# Patient Record
Sex: MALE | Race: WHITE | NOT HISPANIC OR LATINO | Employment: STUDENT | URBAN - METROPOLITAN AREA
[De-identification: names, ages, dates, MRNs, and addresses within clinical notes are randomized per-mention and may not be internally consistent; named-entity substitution may affect disease eponyms.]

---

## 2024-02-26 ENCOUNTER — HOSPITAL ENCOUNTER (OUTPATIENT)
Dept: RADIOLOGY | Facility: CLINIC | Age: 19
Discharge: HOME | End: 2024-02-26
Payer: COMMERCIAL

## 2024-02-26 DIAGNOSIS — M25.532 PAIN IN LEFT WRIST: ICD-10-CM

## 2024-02-26 PROCEDURE — 73110 X-RAY EXAM OF WRIST: CPT | Mod: LT

## 2024-02-26 PROCEDURE — 73110 X-RAY EXAM OF WRIST: CPT | Mod: LEFT SIDE | Performed by: RADIOLOGY

## 2024-03-05 DIAGNOSIS — M84.332A: Primary | ICD-10-CM

## 2024-03-06 ENCOUNTER — HOSPITAL ENCOUNTER (OUTPATIENT)
Dept: RADIOLOGY | Facility: HOSPITAL | Age: 19
Discharge: HOME | End: 2024-03-06
Payer: COMMERCIAL

## 2024-03-06 DIAGNOSIS — M84.332A: ICD-10-CM

## 2024-03-06 PROCEDURE — 73221 MRI JOINT UPR EXTREM W/O DYE: CPT | Mod: LEFT SIDE | Performed by: STUDENT IN AN ORGANIZED HEALTH CARE EDUCATION/TRAINING PROGRAM

## 2024-03-06 PROCEDURE — 73221 MRI JOINT UPR EXTREM W/O DYE: CPT | Mod: LT

## 2024-03-11 ENCOUNTER — OFFICE VISIT (OUTPATIENT)
Dept: ORTHOPEDIC SURGERY | Facility: HOSPITAL | Age: 19
End: 2024-03-11
Payer: COMMERCIAL

## 2024-03-11 DIAGNOSIS — S69.82XA TFCC (TRIANGULAR FIBROCARTILAGE COMPLEX) INJURY, LEFT, INITIAL ENCOUNTER: Primary | ICD-10-CM

## 2024-03-11 PROCEDURE — 99204 OFFICE O/P NEW MOD 45 MIN: CPT | Performed by: STUDENT IN AN ORGANIZED HEALTH CARE EDUCATION/TRAINING PROGRAM

## 2024-03-11 ASSESSMENT — PAIN SCALES - GENERAL: PAINLEVEL_OUTOF10: 9

## 2024-03-11 ASSESSMENT — PAIN DESCRIPTION - DESCRIPTORS: DESCRIPTORS: ACHING

## 2024-03-11 ASSESSMENT — PAIN - FUNCTIONAL ASSESSMENT: PAIN_FUNCTIONAL_ASSESSMENT: 0-10

## 2024-03-11 NOTE — PROGRESS NOTES
History of Present Illness   Patient presents today for evaluation of side: left upper extremity pain.    The patient sustained an acute injury on  approximately 2 weeks ago .  The patient is a  he [employer].  He injured his left wrist while playing baseball approximately 2 weeks ago.  He had x-rays performed which were negative.  Continues to have pain and had an MRI which demonstrated a TFCC tear.  He has not been immobilized.  He has not had any formal treatment.  He has tried to return to sport however he cannot swing a bat.  The patient denies any loss of consciousness or additional significant injuries.  The patient denies any current numbness or tingling.  The pain is sharp, acute in nature, better with rest worse with motion.    The patient is right-hand dominant.  He was seen with his father and girlfriend present today.     No past medical history on file.    Medication Documentation Review Audit       Reviewed by Jenniffer Reid MA (Medical Assistant) on 03/11/24 at 0903      Medication Order Taking? Sig Documenting Provider Last Dose Status            No Medications to Display                                   No Known Allergies    Social History     Socioeconomic History    Marital status: Single     Spouse name: Not on file    Number of children: Not on file    Years of education: Not on file    Highest education level: Not on file   Occupational History    Not on file   Tobacco Use    Smoking status: Not on file    Smokeless tobacco: Not on file   Substance and Sexual Activity    Alcohol use: Not on file    Drug use: Not on file    Sexual activity: Not on file   Other Topics Concern    Not on file   Social History Narrative    Not on file     Social Determinants of Health     Financial Resource Strain: Not on file   Food Insecurity: Not on file   Transportation Needs: Not on file   Physical Activity: Not on file   Stress: Not on file   Social Connections: Not on file    Intimate Partner Violence: Not on file   Housing Stability: Not on file       No past surgical history on file.       Review of Systems   GENERAL: Negative  GI: Negative  MUSCULOSKELETAL: See HPI  SKIN: Negative  NEURO:  Negative     Physical Exam:  side: left upper extremity:  Skin healthy to gross inspection, no breakdown  No swelling / ecchymosis noted  Mild pain to palpation over the fovea.  Mild pain with axial loading and ulnar deviation.  Nontender over the ECU.  Negative ECU Synergy test.  No subluxating ECU tendon with wrist range of motion.  Nontender over the DRUJ.  Nontender over the LT interval.  No pain with DRUJ compression testing.  Negative piano key test.  No instability with DRUJ testing in neutral, supination, pronation..  This is symmetrical to the contralateral wrist.  Intact flexion and extension of 1st IP joint and finger abduction  Sensation intact to light touch medial / ulnar and radial nerve distribution   Good cap refill     Imaging  XR of the left wrist reviewed in office today.  No fracture or dislocation    MRI of the left wrist taken reviewed in office today.  There is a split tear of the ECU tendon.  However the tendon remains in its groove.  There is a TFCC tear in the ulnar triquetral and meniscal homolog.     Assessment   Patient with an acute side: left TFCC tear     Plan:  We had a lengthy discussion about ulnar-sided wrist pain and the likely etiologies.  We reviewed TFCC pathology, ECU tendinitis, ulnar impaction syndrome.  There is no radiographic or clinical evidence of ulnar impaction.  Symptoms appear to be localized to the TFCC.  The patient had an MRI consistent with a TFCC injury however his DRUJ remains stable on exam.  I have recommended initial steroid injection with casting, as many pathologies in the ulnar wrist are responsive to this.  They are amenable to this plan and wish to proceed.  I would like to see them back in 4 to 6 weeks for repeat evaluation or  sooner with any concerns.  If, at that time, they demonstrate a good response to injection, we can consider repeat injection and/or a gradual return to activity.  He will likely start occupational therapy at that time.  All questions answered today.      Follow-up  4 to 6 weeks

## 2024-04-08 ENCOUNTER — APPOINTMENT (OUTPATIENT)
Dept: ORTHOPEDIC SURGERY | Facility: HOSPITAL | Age: 19
End: 2024-04-08
Payer: COMMERCIAL

## 2024-04-09 ENCOUNTER — APPOINTMENT (OUTPATIENT)
Dept: OCCUPATIONAL THERAPY | Facility: HOSPITAL | Age: 19
End: 2024-04-09
Payer: COMMERCIAL

## 2024-04-09 ENCOUNTER — OFFICE VISIT (OUTPATIENT)
Dept: ORTHOPEDIC SURGERY | Facility: HOSPITAL | Age: 19
End: 2024-04-09
Payer: COMMERCIAL

## 2024-04-09 DIAGNOSIS — S69.82XA TFCC (TRIANGULAR FIBROCARTILAGE COMPLEX) INJURY, LEFT, INITIAL ENCOUNTER: Primary | ICD-10-CM

## 2024-04-09 PROCEDURE — 99213 OFFICE O/P EST LOW 20 MIN: CPT | Performed by: STUDENT IN AN ORGANIZED HEALTH CARE EDUCATION/TRAINING PROGRAM

## 2024-04-09 PROCEDURE — L3908 WHO COCK-UP NONMOLDE PRE OTS: HCPCS | Performed by: STUDENT IN AN ORGANIZED HEALTH CARE EDUCATION/TRAINING PROGRAM

## 2024-04-09 ASSESSMENT — PAIN - FUNCTIONAL ASSESSMENT: PAIN_FUNCTIONAL_ASSESSMENT: 0-10

## 2024-04-09 ASSESSMENT — PAIN SCALES - GENERAL: PAINLEVEL_OUTOF10: 0 - NO PAIN

## 2024-04-09 NOTE — PROGRESS NOTES
History of Present Illness   Patient presents today for evaluation of side: left upper extremity pain.    The patient sustained an acute injury on  approximately 2 weeks ago .  The patient is a  he [employer].  He injured his left wrist while playing baseball approximately 2 weeks ago.  He had x-rays performed which were negative.  Continues to have pain and had an MRI which demonstrated a TFCC tear.  He has not been immobilized.  He has not had any formal treatment.  He has tried to return to sport however he cannot swing a bat.  The patient denies any loss of consciousness or additional significant injuries.  The patient denies any current numbness or tingling.  The pain is sharp, acute in nature, better with rest worse with motion.    The patient is right-hand dominant.  He was seen with his father and girlfriend present today.    4/9/2024 follow-up:  Patient presents for follow-up today.  He has remained in a cast over the last 4 weeks.  As last appointment he had a corticosteroid injection to the TFCC region.  He states that his ulnar-sided wrist pain has significantly improved however he has been immobilized.  He denies numbness and tingling.  He denies fevers or chills.  He is currently registered in this ER as he will be missing his season.     No past medical history on file.    Medication Documentation Review Audit       Reviewed by Jenniffer Reid MA (Medical Assistant) on 03/11/24 at 0903      Medication Order Taking? Sig Documenting Provider Last Dose Status            No Medications to Display                                   No Known Allergies    Social History     Socioeconomic History    Marital status: Single     Spouse name: Not on file    Number of children: Not on file    Years of education: Not on file    Highest education level: Not on file   Occupational History    Not on file   Tobacco Use    Smoking status: Not on file    Smokeless tobacco: Not on file    Substance and Sexual Activity    Alcohol use: Not on file    Drug use: Not on file    Sexual activity: Not on file   Other Topics Concern    Not on file   Social History Narrative    Not on file     Social Determinants of Health     Financial Resource Strain: Not on file   Food Insecurity: Not on file   Transportation Needs: Not on file   Physical Activity: Not on file   Stress: Not on file   Social Connections: Not on file   Intimate Partner Violence: Not on file   Housing Stability: Not on file       No past surgical history on file.       Review of Systems   GENERAL: Negative  GI: Negative  MUSCULOSKELETAL: See HPI  SKIN: Negative  NEURO:  Negative     Physical Exam:  side: left upper extremity:  Cast removed in office.  Skin healthy to gross inspection, no breakdown  No swelling / ecchymosis noted  Nontender to palpation over the fovea.  No pain with axial loading and ulnar deviation.  Nontender over the ECU.  Negative ECU Synergy test.  No subluxating ECU tendon with wrist range of motion.  Nontender over the DRUJ.  Nontender over the LT interval.  No pain with DRUJ compression testing.  Negative piano key test.  No instability with DRUJ testing in neutral, supination, pronation..  This is symmetrical to the contralateral wrist.  Intact flexion and extension of 1st IP joint and finger abduction  Sensation intact to light touch medial / ulnar and radial nerve distribution   Good cap refill     Imaging  XR of the left wrist reviewed in office today.  No fracture or dislocation    MRI of the left wrist taken reviewed in office today.  There is a split tear of the ECU tendon.  However the tendon remains in its groove.  There is a TFCC tear in the ulnar triquetral and meniscal homolog.     Assessment   Patient with an side: left TFCC tear     Plan:  Patient's cast removed in office today.  He is nontender over the ulnar side of the wrist.  His provocative exam findings are negative.  Overall he is doing well.  We  will get him into occupational hand therapy.  I referred to a hand therapist that specifically works with athletes.  He was given a cock up wrist brace.  He will slowly return to activity the guidance of therapist and athletic trainers.  He will follow-up in 4 weeks     Follow-up  4  weeks

## 2024-04-11 ENCOUNTER — EVALUATION (OUTPATIENT)
Dept: OCCUPATIONAL THERAPY | Facility: HOSPITAL | Age: 19
End: 2024-04-11
Payer: COMMERCIAL

## 2024-04-11 DIAGNOSIS — S69.82XA TFCC (TRIANGULAR FIBROCARTILAGE COMPLEX) INJURY, LEFT, INITIAL ENCOUNTER: ICD-10-CM

## 2024-04-11 PROCEDURE — 97110 THERAPEUTIC EXERCISES: CPT | Mod: GO | Performed by: OCCUPATIONAL THERAPIST

## 2024-04-11 PROCEDURE — 97165 OT EVAL LOW COMPLEX 30 MIN: CPT | Mod: GO | Performed by: OCCUPATIONAL THERAPIST

## 2024-04-11 PROCEDURE — 97016 VASOPNEUMATIC DEVICE THERAPY: CPT | Mod: GO | Performed by: OCCUPATIONAL THERAPIST

## 2024-04-11 ASSESSMENT — PAIN - FUNCTIONAL ASSESSMENT: PAIN_FUNCTIONAL_ASSESSMENT: 0-10

## 2024-04-11 ASSESSMENT — PAIN SCALES - GENERAL: PAINLEVEL_OUTOF10: 0 - NO PAIN

## 2024-04-11 NOTE — PROGRESS NOTES
Occupational Therapy  Occupational Therapy Orthopedic Evaluation    Patient Name: Shorty Mitchell  MRN: [id number]  Today's Date: 4/11/2024     Insurance:  Visit number: 1 of BMN  Authorization info: No auth  Insurance Type: MMO    General:  Reason for visit: L wrist TFCC injury  Referred by: Dr Oseguera    Current Problem  1. TFCC (triangular fibrocartilage complex) injury, left, initial encounter  Referral to Occupational Therapy          Precautions: No WB  TFCC rehab protool       Medical History Form: Reviewed (scanned into chart)    Subjective:   Chief Complaint: L wrist  KIMBERLYN: pt dove for a ball in practice, felt some pain and then a pop later in the day  DOI: 2/8/24      Hand Dominance: Right    Current Condition since injury:   better      PAIN  Pain Assessment: 0-10  Pain Score: 0 - No pain  Up to 4-5/10  Location: L ulnar wrist  Description: sharp  Aggravating Factors: any quick movement of the wrist  Relieving Factors: wrist brace, heat    Relevant Information (PMH & Previous Tests/Imaging): MRI 3/6/24 -TFCC tear/split ECU tendon  Previous Interventions/Treatments: casted x4 weeks    Prior Level of Function (PLOF)  Exercise/Physical Activity: [employer], [place].  Currently not playing, will have 2 more years of eligibility  Work/School: Sports management major  Current ADL/IADL Status: limited use of R hand in brace     Patients Living Environment: Reviewed and no concern    Primary Language: English    Pt goals for therapy: return to play and use of arm    Red Flags: Do you have any of the following? No  Fever/chills, unexplained weight changes, dizziness/fainting, unexplained change in bowel or bladder functions, unexplained malaise or muscle weakness, night pain/sweats, numbness or tingling    Objective:  WRIST AROM (Degrees)   R L   Extension 75 60   Flexion 70 60   Radial Deviation 20 15   Ulnar Deviation 40 30   Pronation 70 60   Supination 75 75      Right Hand AROM (degrees)  WNL      Hand Strength Measures (lbs)   R L   Dynamometer  defer defer   Lateral Pinch defer defer   3jaw Pinch defer defer        Special Tests    TFCC   Ulnar Fovea Sign: +  TFCC Load Test: +    AIN Weakness: -  PIN Weakness: -    Physical Observation: unremarkable  Edema: none observed    Sensory: intact light touch  Numbness/Tingling: none reported    Outcome Measures:  DASH: 29.55    EDUCATION: home exercise program, plan of care, activity modifications, pain management, and injury pathology       Goals:      Plan of care was developed with input and agreement by the patient    Treatments:     Modalities:      18 min  X10 min themx cold/hot compression to L wrist  34 degrees F @ 45 mmHg pressure   X8 min continuous 3MHz @ 1.0w/cm2 to L ulnar wrist      Therapeutic Exercise:   10 min  HEP discussed -  AROM wrist flex/ext, RD/UD, pro/sup, AAROM ball rolls     Manual Therapy:     5 min  STM/IASTM dorsal forearm and wrist       Assessment: Patient is a 20 yo male  s/p TFCC/ECU injury to L wrist.  He has been immobilized x4 weeks and has no instability at this time.  His condition is resulting in limited participation in pain-free ADLs and inability to perform at their prior level of function. Pt would benefit from occupational therapy to address the impairments found & listed previously in the objective section in order to return to safe and pain-free ADLs and prior level of function.       Plan:      Planned Interventions include: therapeutic exercise, therapeutic activity, self-care home management, manual therapy, therapeutic activities, gait training, neuromuscular coordination, vasopneumatic, dry needling, aquatic therapy, electric stimulation, fluidotherapy, ultrasound, kinesiotaping, orthosis fabrication, wound care  Frequency: 1-2 x Week  Duration: 12 Weeks    Butch Louis, OT

## 2024-04-18 ENCOUNTER — TREATMENT (OUTPATIENT)
Dept: OCCUPATIONAL THERAPY | Facility: HOSPITAL | Age: 19
End: 2024-04-18
Payer: COMMERCIAL

## 2024-04-18 DIAGNOSIS — S69.82XA TFCC (TRIANGULAR FIBROCARTILAGE COMPLEX) INJURY, LEFT, INITIAL ENCOUNTER: Primary | ICD-10-CM

## 2024-04-18 PROCEDURE — 97035 APP MDLTY 1+ULTRASOUND EA 15: CPT | Mod: GO | Performed by: OCCUPATIONAL THERAPIST

## 2024-04-18 PROCEDURE — 97022 WHIRLPOOL THERAPY: CPT | Mod: GO | Performed by: OCCUPATIONAL THERAPIST

## 2024-04-18 PROCEDURE — 97016 VASOPNEUMATIC DEVICE THERAPY: CPT | Mod: GO | Performed by: OCCUPATIONAL THERAPIST

## 2024-04-18 PROCEDURE — 97140 MANUAL THERAPY 1/> REGIONS: CPT | Mod: GO | Performed by: OCCUPATIONAL THERAPIST

## 2024-04-18 ASSESSMENT — PAIN - FUNCTIONAL ASSESSMENT: PAIN_FUNCTIONAL_ASSESSMENT: 0-10

## 2024-04-18 ASSESSMENT — PAIN SCALES - GENERAL: PAINLEVEL_OUTOF10: 0 - NO PAIN

## 2024-04-18 NOTE — PROGRESS NOTES
Occupational Therapy  Occupational Therapy Treatment    Patient Name: Shorty Mitchell  MRN: [id number]  Today's Date: 4/18/2024  Time Calculation  Start Time: 0855  Stop Time: 0941  Time Calculation (min): 46 min  Insurance:  Visit number: 1 of N  Authorization info: No auth  Insurance Type: MMO    General:  Reason for visit: L wrist TFCC injury  Referred by: Dr Oseguera  DOI: 2/8/24     Current Problem  1. TFCC (triangular fibrocartilage complex) injury, left, initial encounter            Precautions   Progress within pain tolerance      Subjective:   Patient reports no new concerns    Performing HEP?: Yes    Pain  Pain Assessment: 0-10  Pain Score: 0 - No pain  Location: L wrist ulnar  Description: occasional tweak, less pain overall    Objective:     WRIST AROM (Degrees)    R L   Extension 75 75   Flexion 70 70   Radial Deviation 20 20   Ulnar Deviation 40 40   Pronation 70 70   Supination 75 75      Right Hand AROM (degrees)  WNL     Hand Strength Measures (lbs)    R L   Dynamometer  140 140   Lateral Pinch     3jaw Pinch                                Special Tests     TFCC              Ulnar Fovea Sign: +  TFCC Load Test: +     AIN Weakness: -  PIN Weakness: -     Physical Observation: unremarkable  Edema: none observed     Sensory: intact light touch  Numbness/Tingling: none reported    Treatments:     Modalities:      26 min  X10 min themx cold/hot compression to L hand/wrist 34 degrees F @ 45 mmHg pressure   X8 min fluiotherapy to L hand, 120 degrees F @ 70% air speed  X8 min continuous 3MHz @ 1.0w/cm2 to L ulnar wrist       Therapeutic Exercise:   5 min  Yellow tband wrist flex/ext, RD 2x10 each   exerciser 25# 3x10     Manual Therapy:     15 min  STM/IASTM dorsal/volar forearm and wrist  Light passive wrist flex/ext, RD/UD to tolerance       Assessment: Pt now with symmetrical motion as well as  strength without pain.  He does still get a small twinge of pain with certain motions however has  been wearing splint as directed.  We will plan to slowly advance his activity, he is starting  exercises for his HEP today, and we will plan to start light wrist stability exercise next visit as long as he continues to do as well as he currently is.       Plan: Continue with ROM/strengthening for hand wrist as tolerated.  Anticipate progressing to wrist strengthening next session       Butch Louis, OT

## 2024-04-25 ENCOUNTER — TREATMENT (OUTPATIENT)
Dept: OCCUPATIONAL THERAPY | Facility: HOSPITAL | Age: 19
End: 2024-04-25
Payer: COMMERCIAL

## 2024-04-25 DIAGNOSIS — S69.82XA TFCC (TRIANGULAR FIBROCARTILAGE COMPLEX) INJURY, LEFT, INITIAL ENCOUNTER: Primary | ICD-10-CM

## 2024-04-25 PROCEDURE — 97035 APP MDLTY 1+ULTRASOUND EA 15: CPT | Mod: GO | Performed by: OCCUPATIONAL THERAPIST

## 2024-04-25 PROCEDURE — 97110 THERAPEUTIC EXERCISES: CPT | Mod: GO | Performed by: OCCUPATIONAL THERAPIST

## 2024-04-25 PROCEDURE — 97022 WHIRLPOOL THERAPY: CPT | Mod: GO | Performed by: OCCUPATIONAL THERAPIST

## 2024-04-25 ASSESSMENT — PAIN - FUNCTIONAL ASSESSMENT: PAIN_FUNCTIONAL_ASSESSMENT: 0-10

## 2024-04-25 ASSESSMENT — PAIN SCALES - GENERAL: PAINLEVEL_OUTOF10: 0 - NO PAIN

## 2024-04-25 NOTE — PROGRESS NOTES
Occupational Therapy  Occupational Therapy Treatment    Patient Name: Shorty Mitchell  MRN: [id number]  Today's Date: 4/25/2024  Time Calculation  Start Time: 1000  Stop Time: 1046  Time Calculation (min): 46 min  Insurance:  Visit number: 3 of BMN  Authorization info: No auth  Insurance Type: MMO    General:  Reason for visit: L wrist TFCC injury  Referred by: Dr Oseguera  DOI: 2/8/24     Current Problem  1. TFCC (triangular fibrocartilage complex) injury, left, initial encounter            Precautions   Progress within pain tolerance      Subjective:   Patient reports wrist has been feeling better, has taken brace off for light activity    Performing HEP?: Yes    Pain  Pain Assessment: 0-10  Pain Score: 0 - No pain  Location: L wrist ulnar  Description: occasional tweak, less pain overall    Objective:     WRIST AROM (Degrees)    R L   Extension 75 75   Flexion 70 70   Radial Deviation 20 20   Ulnar Deviation 40 40   Pronation 70 70   Supination 75 75      Right Hand AROM (degrees)  WNL     Hand Strength Measures (lbs)    R L   Dynamometer  140 140   Lateral Pinch     3jaw Pinch                                Special Tests     TFCC              Ulnar Fovea Sign: +  TFCC Load Test: +     AIN Weakness: -  PIN Weakness: -     Physical Observation: unremarkable  Edema: none observed     Sensory: intact light touch  Numbness/Tingling: none reported    Treatments:     Modalities:      16 min    X8 min fluiotherapy to L hand, 120 degrees F @ 70% air speed  X8 min continuous 3MHz @ 1.0w/cm2 to L ulnar wrist       Therapeutic Exercise:   30 min  HEP progressed - dbell/tband wrist flex/ext, pro/sup, RD/UD, shoulder eleveated UD  Trialed UE resistance exercises with focus on wrist stability - neutral  hammer curl, neutral tricep ext, row with good tolerance       Assessment: Pt progressed to light resistance exercises today with good tolerance.  We will continue to progress upper extremity strength in the upcoming  weeks.  He may be ready in a few weeks to start easing into regular sport activity.  We can perform more whole body kinetic chain exercises as he continues to build wrist stability and is able to tolerate more.       Plan: Continue with ROM/strengthening for hand wrist and progression to more proximal UE strengthening requiring increased wrist stability.       Butch Louis, OT

## 2024-04-26 ENCOUNTER — APPOINTMENT (OUTPATIENT)
Dept: OCCUPATIONAL THERAPY | Facility: HOSPITAL | Age: 19
End: 2024-04-26
Payer: COMMERCIAL

## 2024-04-30 ENCOUNTER — OFFICE VISIT (OUTPATIENT)
Dept: ORTHOPEDIC SURGERY | Facility: HOSPITAL | Age: 19
End: 2024-04-30
Payer: COMMERCIAL

## 2024-04-30 DIAGNOSIS — S69.82XA TFCC (TRIANGULAR FIBROCARTILAGE COMPLEX) INJURY, LEFT, INITIAL ENCOUNTER: Primary | ICD-10-CM

## 2024-04-30 PROCEDURE — 99213 OFFICE O/P EST LOW 20 MIN: CPT | Performed by: STUDENT IN AN ORGANIZED HEALTH CARE EDUCATION/TRAINING PROGRAM

## 2024-04-30 NOTE — PROGRESS NOTES
History of Present Illness   Patient presents today for evaluation of side: left upper extremity pain.    The patient sustained an acute injury on  approximately 2 weeks ago .  The patient is a  he [employer] the [employer].  He injured his left wrist while playing baseball approximately 2 weeks ago.  He had x-rays performed which were negative.  Continues to have pain and had an MRI which demonstrated a TFCC tear.  He has not been immobilized.  He has not had any formal treatment.  He has tried to return to sport however he cannot swing a bat.  The patient denies any loss of consciousness or additional significant injuries.  The patient denies any current numbness or tingling.  The pain is sharp, acute in nature, better with rest worse with motion.    The patient is right-hand dominant.  He was seen with his father and girlfriend present today.    4/9/2024 follow-up:  Patient presents for follow-up today.  He has remained in a cast over the last 4 weeks.  As last appointment he had a corticosteroid injection to the TFCC region.  He states that his ulnar-sided wrist pain has significantly improved however he has been immobilized.  He denies numbness and tingling.  He denies fevers or chills.  He is currently registered in this ER as he will be missing his season.    4/30/2024 follow-up:  Patient has been attending occupational hand therapy.  He has been making good improvements.  He denies any pain at rest.  He states he gets intermittent mild pain.  He has not started to return to sport yet.  He has not swelling about yet.  He denies any numbness and tingling.     No past medical history on file.    Medication Documentation Review Audit       Reviewed by Jenniffer Reid MA (Medical Assistant) on 04/30/24 at 0855      Medication Order Taking? Sig Documenting Provider Last Dose Status            No Medications to Display                                   No Known Allergies    Social History      Socioeconomic History    Marital status: Single     Spouse name: Not on file    Number of children: Not on file    Years of education: Not on file    Highest education level: Not on file   Occupational History    Not on file   Tobacco Use    Smoking status: Not on file    Smokeless tobacco: Not on file   Substance and Sexual Activity    Alcohol use: Not on file    Drug use: Not on file    Sexual activity: Not on file   Other Topics Concern    Not on file   Social History Narrative    Not on file     Social Determinants of Health     Financial Resource Strain: Not on file   Food Insecurity: Not on file   Transportation Needs: Not on file   Physical Activity: Not on file   Stress: Not on file   Social Connections: Not on file   Intimate Partner Violence: Not on file   Housing Stability: Not on file       No past surgical history on file.       Review of Systems   GENERAL: Negative  GI: Negative  MUSCULOSKELETAL: See HPI  SKIN: Negative  NEURO:  Negative     Physical Exam:  side: left upper extremity:  Skin healthy to gross inspection, no breakdown  No swelling / ecchymosis noted  Nontender to palpation over the fovea.  No pain with axial loading and ulnar deviation.  Nontender over the ECU.  Negative ECU Synergy test.  No subluxating ECU tendon with wrist range of motion.  Nontender over the DRUJ.  Nontender over the LT interval.  No pain with DRUJ compression testing.  Negative piano key test.  No instability with DRUJ testing in neutral, supination, pronation..  This is symmetrical to the contralateral wrist.  Intact flexion and extension of 1st IP joint and finger abduction  Sensation intact to light touch medial / ulnar and radial nerve distribution   Good cap refill     Imaging  XR of the left wrist reviewed in office today.  No fracture or dislocation    MRI of the left wrist reviewed in office today.  There is a split tear of the ECU tendon.  However the tendon remains in its groove.  There is a TFCC tear  in the ulnar triquetral and meniscal homolog.     Assessment   Patient with an side: left TFCC tear     Plan:  Patient's symptoms have significantly improved.  He is currently in occupational hand therapy with a focus on return to sport.  He is making appropriate gains with his therapy.  He denies any pain at rest and gets very mild intermittent pain with activity.  His baseball season is almost over and he does not plan to return the season.  He is going to focus on returning next year.  At this time he can follow-up as needed.     Follow-up  as needed

## 2024-05-03 ENCOUNTER — TREATMENT (OUTPATIENT)
Dept: OCCUPATIONAL THERAPY | Facility: HOSPITAL | Age: 19
End: 2024-05-03
Payer: COMMERCIAL

## 2024-05-03 DIAGNOSIS — S69.82XA TFCC (TRIANGULAR FIBROCARTILAGE COMPLEX) INJURY, LEFT, INITIAL ENCOUNTER: ICD-10-CM

## 2024-05-03 PROCEDURE — 97022 WHIRLPOOL THERAPY: CPT | Mod: GO | Performed by: OCCUPATIONAL THERAPIST

## 2024-05-03 PROCEDURE — 97140 MANUAL THERAPY 1/> REGIONS: CPT | Mod: GO | Performed by: OCCUPATIONAL THERAPIST

## 2024-05-03 PROCEDURE — 97035 APP MDLTY 1+ULTRASOUND EA 15: CPT | Mod: GO | Performed by: OCCUPATIONAL THERAPIST

## 2024-05-03 PROCEDURE — 97530 THERAPEUTIC ACTIVITIES: CPT | Mod: GO | Performed by: OCCUPATIONAL THERAPIST

## 2024-05-03 PROCEDURE — 97110 THERAPEUTIC EXERCISES: CPT | Mod: GO | Performed by: OCCUPATIONAL THERAPIST

## 2024-05-03 ASSESSMENT — PAIN - FUNCTIONAL ASSESSMENT: PAIN_FUNCTIONAL_ASSESSMENT: 0-10

## 2024-05-03 ASSESSMENT — PAIN SCALES - GENERAL: PAINLEVEL_OUTOF10: 0 - NO PAIN

## 2024-05-03 NOTE — PROGRESS NOTES
Occupational Therapy  Occupational Therapy Treatment    Patient Name: Shorty Mitchell  MRN: [id number]  Today's Date: 5/3/2024  Time Calculation  Start Time: 1200  Stop Time: 1301  Time Calculation (min): 61 min  Insurance:  Visit number: 3 of BMN  Authorization info: No auth  Insurance Type: MMO    General:  Reason for visit: L wrist TFCC injury  Referred by: Dr Oseguera  DOI: 2/8/24     Current Problem  1. TFCC (triangular fibrocartilage complex) injury, left, initial encounter  Follow Up In Occupational Therapy          Precautions   Progress within pain tolerance      Subjective: Pt has been performing upper body exercises without issue, mild soreness after.      Performing HEP?: Yes    Pain  Pain Assessment: 0-10  Pain Score: 0 - No pain  Location: L wrist ulnar  Description: occasional tweak, less pain overall    Objective:     WRIST AROM (Degrees)    R L   Extension 75 75   Flexion 70 70   Radial Deviation 20 20   Ulnar Deviation 40 40   Pronation 70 70   Supination 75 75      Right Hand AROM (degrees)  WNL     Hand Strength Measures (lbs)    R L   Dynamometer  140 140   HHD wrist ext average 3 trial (lb) 30.1 28.9                                   Special Tests     TFCC              Ulnar Fovea Sign: +  TFCC Load Test: +     AIN Weakness: -  PIN Weakness: -     Physical Observation: unremarkable  Edema: none observed     Sensory: intact light touch  Numbness/Tingling: none reported    Treatments:     Modalities:      16 min  X8 min fluiotherapy to L hand, 120 degrees F @ 70% air speed  X8 min continuous 3MHz @ 1.0w/cm2 to L ulnar wrist     Manual Therapy:    15 min  STM/IASTM wrist extensors  PROM wrist flex/ext to tolerance  Therapeutic Exercise:   15 min  [place] press, row, shoulder ext 2x10  Red flexbar wrist flex/ext 2x10  Pronator 2# 2x10 pro/sup   Therapeutic Activity:   15 min  10# med ball stand to sit sim batting wall slam 2x10  Light batting off krzysztof x15 hits      Assessment: Pt continues to do  well and we are able to progress his activity well.  Advanced to more forceful batting/throwing drills today without increase in pain.  He may continue to have a little soreness here and there with exercises and activity which should continue to normalize as he builds his strength.  He did start some light batting progression today.  If he continues to feel alright over the next 24 hours he can start to progress this over the weekend.  He knows not to push through any pain or elevated discomfort.    Plan: Continue with progressive strengthening       Butch Louis, OT

## 2024-05-07 ENCOUNTER — TREATMENT (OUTPATIENT)
Dept: OCCUPATIONAL THERAPY | Facility: HOSPITAL | Age: 19
End: 2024-05-07
Payer: COMMERCIAL

## 2024-05-07 DIAGNOSIS — S69.82XA TFCC (TRIANGULAR FIBROCARTILAGE COMPLEX) INJURY, LEFT, INITIAL ENCOUNTER: ICD-10-CM

## 2024-05-07 PROCEDURE — 97110 THERAPEUTIC EXERCISES: CPT | Mod: GO | Performed by: OCCUPATIONAL THERAPIST

## 2024-05-07 PROCEDURE — 97140 MANUAL THERAPY 1/> REGIONS: CPT | Mod: GO | Performed by: OCCUPATIONAL THERAPIST

## 2024-05-07 PROCEDURE — 97035 APP MDLTY 1+ULTRASOUND EA 15: CPT | Mod: GO | Performed by: OCCUPATIONAL THERAPIST

## 2024-05-07 PROCEDURE — 97022 WHIRLPOOL THERAPY: CPT | Mod: GO | Performed by: OCCUPATIONAL THERAPIST

## 2024-05-07 ASSESSMENT — PAIN - FUNCTIONAL ASSESSMENT: PAIN_FUNCTIONAL_ASSESSMENT: 0-10

## 2024-05-07 ASSESSMENT — PAIN SCALES - GENERAL: PAINLEVEL_OUTOF10: 0 - NO PAIN

## 2024-05-07 NOTE — PROGRESS NOTES
Occupational Therapy  Occupational Therapy Treatment    Patient Name: Shorty Mitchell  MRN: [id number]  Today's Date: 5/7/2024  Time Calculation  Start Time: 0900  Stop Time: 1001  Time Calculation (min): 61 min  Insurance:  Visit number: 4 of N  Authorization info: No auth  Insurance Type: MMO    General:  Reason for visit: L wrist TFCC injury  Referred by: Dr Oseguera  DOI: 2/8/24     Current Problem  1. TFCC (triangular fibrocartilage complex) injury, left, initial encounter  Follow Up In Occupational Therapy          Precautions   Progress within pain tolerance      Subjective: Pt has been taking more practice swings      Performing HEP?: Yes    Pain  Pain Assessment: 0-10  Pain Score: 0 - No pain  Location: L wrist ulnar  Description: occasional tweak, less pain overall    Objective:     WRIST AROM (Degrees)    R L   Extension 75 75   Flexion 70 70   Radial Deviation 20 20   Ulnar Deviation 40 40   Pronation 70 70   Supination 75 75      Right Hand AROM (degrees)  WNL     Hand Strength Measures (lbs)    R L   Dynamometer  neutral/pron/sup 140 140/120/100   HHD wrist ext average 3 trial (lb) 30.1 28.9                                   Special Tests     TFCC              Ulnar Fovea Sign: +  TFCC Load Test: +     AIN Weakness: -  PIN Weakness: -     Physical Observation: unremarkable  Edema: none observed     Sensory: intact light touch  Numbness/Tingling: none reported    Treatments:     Modalities:      16 min  X8 min fluiotherapy to L hand, 120 degrees F @ 70% air speed  X8 min continuous 3MHz @ 1.0w/cm2 to L ulnar wrist     Manual Therapy:    15 min  STM/IASTM wrist extensors  PROM wrist flex/ext to tolerance  Therapeutic Exercise:   30 min  Bro rev shoulder ext, rope held torso rotation sim batting 2x10 each  BOSU WB weight shift/pushup 2x10 each  Kbell swing catch 2x10  Wrist rope forearm burner 1.25# plate x3 reps wrist flex/ext  Pronator 3# 2x10 pro/sup          Assessment: Pt able to perform  exercises to include WB without increase in pain in L wrist.  He has started to ramp up his batting without increase in pain.  Overall I think his wrist looks great.  He can continue to work on progressive exercises as he has been gradually without specific restrictions.  He will reach out if he has any issues going forward.    Plan: Continue with progressive strengthening through HEP.  Pt instructed to call If any issues arise.  Discharge OT in 4 weeks       Butch Louis, OT